# Patient Record
Sex: MALE | Race: WHITE | NOT HISPANIC OR LATINO | Employment: STUDENT | URBAN - METROPOLITAN AREA
[De-identification: names, ages, dates, MRNs, and addresses within clinical notes are randomized per-mention and may not be internally consistent; named-entity substitution may affect disease eponyms.]

---

## 2022-10-30 ENCOUNTER — OFFICE VISIT (OUTPATIENT)
Dept: FAMILY MEDICINE | Facility: CLINIC | Age: 22
End: 2022-10-30
Payer: COMMERCIAL

## 2022-10-30 VITALS
SYSTOLIC BLOOD PRESSURE: 123 MMHG | OXYGEN SATURATION: 96 % | RESPIRATION RATE: 16 BRPM | WEIGHT: 169.2 LBS | DIASTOLIC BLOOD PRESSURE: 73 MMHG | TEMPERATURE: 98.2 F | HEART RATE: 64 BPM

## 2022-10-30 DIAGNOSIS — R30.0 DYSURIA: ICD-10-CM

## 2022-10-30 DIAGNOSIS — R35.0 URINARY FREQUENCY: Primary | ICD-10-CM

## 2022-10-30 LAB
ALBUMIN UR-MCNC: NEGATIVE MG/DL
APPEARANCE UR: CLEAR
BILIRUB UR QL STRIP: NEGATIVE
COLOR UR AUTO: YELLOW
GLUCOSE UR STRIP-MCNC: NEGATIVE MG/DL
HGB UR QL STRIP: NEGATIVE
KETONES UR STRIP-MCNC: NEGATIVE MG/DL
LEUKOCYTE ESTERASE UR QL STRIP: NEGATIVE
NITRATE UR QL: NEGATIVE
PH UR STRIP: 8.5 [PH] (ref 5–8)
SP GR UR STRIP: 1.02 (ref 1–1.03)
UROBILINOGEN UR STRIP-ACNC: 0.2 E.U./DL

## 2022-10-30 PROCEDURE — 81003 URINALYSIS AUTO W/O SCOPE: CPT | Performed by: PHYSICIAN ASSISTANT

## 2022-10-30 PROCEDURE — 99204 OFFICE O/P NEW MOD 45 MIN: CPT | Performed by: PHYSICIAN ASSISTANT

## 2022-10-30 RX ORDER — DOXYCYCLINE 100 MG/1
100 CAPSULE ORAL 2 TIMES DAILY
Qty: 20 CAPSULE | Refills: 0 | Status: SHIPPED | OUTPATIENT
Start: 2022-10-30 | End: 2022-11-09

## 2022-10-30 ASSESSMENT — ENCOUNTER SYMPTOMS
FEVER: 0
FREQUENCY: 1
HEMATURIA: 1
VOMITING: 0
ABDOMINAL PAIN: 1
NAUSEA: 0
DYSURIA: 1
DIFFICULTY URINATING: 0
FLANK PAIN: 0

## 2022-10-30 NOTE — PATIENT INSTRUCTIONS
Begin taking Doxycycline.   Follow up with Urology if symptoms persist.   You may take Azo as needed.   Follow up if new or worsening symptoms develop.

## 2022-10-30 NOTE — PROGRESS NOTES
Patient presents with:  Urinary Problem: X Friday night. Discomfort. Some abdominal pain. Some burning. Frequency. Little cloudiness.      Clinical Decision Making:  Patient experiencing dysuria.  UA is not indicative of UTI.  Patient is not sexually active and STD is unlikely.   staff collected UA before we are able to do a dirty urine, so we were unable to test for STDs.  Consider possibility for balanitis and nongonococcal urethritis as well.  Patient started on doxycycline for coverage of nongonococcal urethritis.  Patient was also referred to urology in case symptoms fail to improve.  He has not established with any primary care provider and is unlikely he will be able to get in with anyone within the next 3 months due to shortages of available primary care providers.    ICD-10-CM    1. Urinary frequency  R35.0 UA macro with reflex to Microscopic and Culture - Clinc Collect     doxycycline hyclate (VIBRAMYCIN) 100 MG capsule      2. Dysuria  R30.0 Adult Urology  Referral     doxycycline hyclate (VIBRAMYCIN) 100 MG capsule          Patient Instructions   1. Begin taking Doxycycline.   2. Follow up with Urology if symptoms persist.   3. You may take Azo as needed.   4. Follow up if new or worsening symptoms develop.       HPI:  Dave Stockton is a 22 year old male who presents today complaining of urinary discomfort x 2 days.  Patient also reports urinary frequency.  He denies any past medical history of UTIs.  He has not been sexually active in the last 2 years.  He reports some abdominal discomfort.  He denies any fevers, nausea, vomiting, decrease in urine volume, flank pain, genital sores, penile discharge, penile pain, penile swelling, scrotal swelling, or difficulty urinating.  He is hesitant due to the pain.  He has not taken any over-the-counter medications.    History obtained from the patient.    Problem List:  There are no relevant problems documented for this patient.      No  past medical history on file.    Social History     Tobacco Use     Smoking status: Never     Smokeless tobacco: Never   Substance Use Topics     Alcohol use: Not on file       Review of Systems   Constitutional: Negative for fever.   Gastrointestinal: Positive for abdominal pain. Negative for nausea and vomiting.   Genitourinary: Positive for dysuria, frequency, hematuria and testicular pain (mild soreness). Negative for decreased urine volume, difficulty urinating, enuresis, flank pain, genital sores, penile discharge, penile pain, penile swelling, scrotal swelling and urgency.       Vitals:    10/30/22 1321   BP: 123/73   BP Location: Right arm   Patient Position: Sitting   Cuff Size: Adult Regular   Pulse: 64   Resp: 16   Temp: 98.2  F (36.8  C)   TempSrc: Oral   SpO2: 96%   Weight: 76.7 kg (169 lb 3.2 oz)       Physical Exam  Vitals and nursing note reviewed. Exam conducted with a chaperone present.   Constitutional:       General: He is not in acute distress.     Appearance: He is not toxic-appearing or diaphoretic.   HENT:      Head: Normocephalic and atraumatic.      Right Ear: External ear normal.      Left Ear: External ear normal.   Eyes:      Conjunctiva/sclera: Conjunctivae normal.   Pulmonary:      Effort: Pulmonary effort is normal. No respiratory distress.   Genitourinary:     Pubic Area: No rash.       Penis: Circumcised. No erythema, tenderness, discharge, swelling or lesions.    Neurological:      Mental Status: He is alert.   Psychiatric:         Mood and Affect: Mood normal.         Behavior: Behavior normal.         Thought Content: Thought content normal.         Judgment: Judgment normal.         Results:  Results for orders placed or performed in visit on 10/30/22   UA macro with reflex to Microscopic and Culture - Clinc Collect     Status: Abnormal    Specimen: Urine, Clean Catch   Result Value Ref Range    Color Urine Yellow Colorless, Straw, Light Yellow, Yellow    Appearance Urine Clear  Clear    Glucose Urine Negative Negative, 1000 , >=2000 mg/dL    Bilirubin Urine Negative Negative    Ketones Urine Negative Negative, 160  mg/dL    Specific Gravity Urine 1.020 1.005 - 1.030    Blood Urine Negative Negative    pH Urine 8.5 (H) 5.0 - 8.0    Protein Albumin Urine Negative Negative, 300 , >=2000 mg/dL    Urobilinogen Urine 0.2 0.2, 1.0 E.U./dL    Nitrite Urine Negative Negative    Leukocyte Esterase Urine Negative Negative    Narrative    Microscopic not indicated         At the end of the encounter, I discussed results, diagnosis, medications. Discussed red flags for immediate return to clinic/ER, as well as indications for follow up if no improvement. Patient understood and agreed to plan. Patient was stable for discharge.    35 minutes spent on the date of the encounter doing chart review, history and examination, documentation, and further activities as noted.

## 2022-11-14 NOTE — TELEPHONE ENCOUNTER
MEDICAL RECORDS REQUEST   [facility]  [address] [place] [address]  PHONE: [phone]  Fax: [phone]        FUTURE VISIT INFORMATION                                                   Dave Stockton, : 2000 scheduled for future visit at [facility]    APPOINTMENT INFORMATION:    Date: 2023    Provider:  Jossy Galvin CNP    Reason for Visit/Diagnosis: Dysuria    REFERRAL INFORMATION:    Referring provider:  Cony Earl PA-C in [facility]    RECORDS REQUESTED FOR VISIT                                                     NOTES  STATUS/DETAILS   OFFICE NOTE from referring provider  yes, 10/30/2022 -- Cony Earl PA-C in [facility]   MEDICATION LIST  yes   LABS     URINALYSIS (UA)  yes   IMAGING (IMAGES & REPORT)  yes, 2022 -- CT ABD PELVIS     PRE-VISIT CHECKLIST      Record collection complete Yes   Appointment appropriately scheduled           (right time/right provider) Yes   Joint diagnostic appointment coordinated correctly          (ensure right order & amount of time) Yes   MyChart activation Yes   Questionnaire complete If no, please explain pending

## 2022-12-29 ENCOUNTER — PRE VISIT (OUTPATIENT)
Dept: UROLOGY | Facility: CLINIC | Age: 22
End: 2022-12-29

## 2022-12-29 NOTE — TELEPHONE ENCOUNTER
Reason for visit: consult      Dx/Hx/Sx: dysuria     Records/imaging/labs/orders: in epic     At Rooming: standard

## 2023-02-07 ENCOUNTER — PRE VISIT (OUTPATIENT)
Dept: UROLOGY | Facility: CLINIC | Age: 23
End: 2023-02-07

## 2023-02-12 ENCOUNTER — HEALTH MAINTENANCE LETTER (OUTPATIENT)
Age: 23
End: 2023-02-12

## 2023-05-17 ENCOUNTER — ANCILLARY PROCEDURE (OUTPATIENT)
Dept: CT IMAGING | Facility: CLINIC | Age: 23
End: 2023-05-17
Attending: FAMILY MEDICINE
Payer: COMMERCIAL

## 2023-05-17 DIAGNOSIS — R10.32 LEFT LOWER QUADRANT ABDOMINAL PAIN: ICD-10-CM

## 2023-05-17 PROCEDURE — 74177 CT ABD & PELVIS W/CONTRAST: CPT | Performed by: RADIOLOGY

## 2023-05-17 RX ORDER — IOPAMIDOL 755 MG/ML
94 INJECTION, SOLUTION INTRAVASCULAR ONCE
Status: COMPLETED | OUTPATIENT
Start: 2023-05-17 | End: 2023-05-17

## 2023-05-17 RX ADMIN — IOPAMIDOL 94 ML: 755 INJECTION, SOLUTION INTRAVASCULAR at 16:35

## 2024-03-10 ENCOUNTER — HEALTH MAINTENANCE LETTER (OUTPATIENT)
Age: 24
End: 2024-03-10

## 2025-03-16 ENCOUNTER — HEALTH MAINTENANCE LETTER (OUTPATIENT)
Age: 25
End: 2025-03-16